# Patient Record
Sex: FEMALE | Race: WHITE
[De-identification: names, ages, dates, MRNs, and addresses within clinical notes are randomized per-mention and may not be internally consistent; named-entity substitution may affect disease eponyms.]

---

## 2017-01-23 ENCOUNTER — HOSPITAL ENCOUNTER (OUTPATIENT)
Age: 72
Discharge: HOME | End: 2017-01-23
Payer: MEDICARE

## 2017-01-23 DIAGNOSIS — E88.81: Primary | ICD-10-CM

## 2017-09-06 ENCOUNTER — HOSPITAL ENCOUNTER (OUTPATIENT)
Dept: HOSPITAL 76 - LAB.R | Age: 72
End: 2017-09-06
Attending: INTERNAL MEDICINE
Payer: MEDICARE

## 2017-09-06 DIAGNOSIS — Z79.899: ICD-10-CM

## 2017-09-06 DIAGNOSIS — E03.9: ICD-10-CM

## 2017-09-06 DIAGNOSIS — I10: ICD-10-CM

## 2017-09-06 DIAGNOSIS — R31.1: ICD-10-CM

## 2017-09-06 DIAGNOSIS — E11.9: ICD-10-CM

## 2017-09-06 DIAGNOSIS — Z98.84: Primary | ICD-10-CM

## 2017-09-06 LAB
ALBUMIN/GLOB SERPL: 1.4 {RATIO} (ref 1–2.2)
ANION GAP SERPL CALCULATED.4IONS-SCNC: 9 MMOL/L (ref 6–13)
BASOPHILS NFR BLD AUTO: 0.1 10^3/UL (ref 0–0.1)
BASOPHILS NFR BLD AUTO: 0.6 %
BILIRUB BLD-MCNC: 0.4 MG/DL (ref 0.2–1)
BUN SERPL-MCNC: 18 MG/DL (ref 6–20)
CALCIUM UR-MCNC: 9.1 MG/DL (ref 8.5–10.3)
CHLORIDE SERPL-SCNC: 104 MMOL/L (ref 101–111)
CHOLEST SERPL-MCNC: 224 MG/DL
CO2 SERPL-SCNC: 25 MMOL/L (ref 21–32)
CREAT SERPLBLD-SCNC: 0.7 MG/DL (ref 0.4–1)
EOSINOPHIL # BLD AUTO: 0.5 10^3/UL (ref 0–0.7)
EOSINOPHIL NFR BLD AUTO: 6.3 %
ERYTHROCYTE [DISTWIDTH] IN BLOOD BY AUTOMATED COUNT: 14.4 % (ref 12–15)
EST. AVERAGE GLUCOSE BLD GHB EST-MCNC: 137 MG/DL (ref 70–100)
GFRSERPLBLD MDRD-ARVRAT: 83 ML/MIN/{1.73_M2} (ref 89–?)
GLOBULIN SER-MCNC: 2.9 G/DL (ref 2.1–4.2)
GLUCOSE SERPL-MCNC: 86 MG/DL (ref 70–100)
HBA1C BLD-MCNC: 0.64 G/DL
HCT VFR BLD AUTO: 42 % (ref 37–47)
HDLC SERPL-MCNC: 81 MG/DL
HDLC SERPL: 2.8 {RATIO} (ref ?–4.4)
HGB UR QL STRIP: 13.4 G/DL (ref 12–16)
LDLC/HDLC SERPL: 1.5 {RATIO} (ref ?–4.4)
LYMPHOCYTES # SPEC AUTO: 3 10^3/UL (ref 1.5–3.5)
LYMPHOCYTES NFR BLD AUTO: 36.8 %
MCH RBC QN AUTO: 28.5 PG (ref 27–31)
MCHC RBC AUTO-ENTMCNC: 31.9 G/DL (ref 32–36)
MCV RBC AUTO: 89.2 FL (ref 81–99)
MONOCYTES # BLD AUTO: 0.6 10^3/UL (ref 0–1)
MONOCYTES NFR BLD AUTO: 7.7 %
NEUTROPHILS # BLD AUTO: 3.9 10^3/UL (ref 1.5–6.6)
NEUTROPHILS # SNV AUTO: 8.1 X10^3/UL (ref 4.8–10.8)
NEUTROPHILS NFR BLD AUTO: 48.6 %
NRBC # BLD AUTO: 0 /100WBC
PDW BLD AUTO: 8.1 FL (ref 7.9–10.8)
POTASSIUM SERPL-SCNC: 3.9 MMOL/L (ref 3.5–5)
PROT SPEC-MCNC: 7 G/DL (ref 6.7–8.2)
RBC MAR: 4.7 10^6/UL (ref 4.2–5.4)
SODIUM SERPLBLD-SCNC: 138 MMOL/L (ref 135–145)
TRIGL P FAST SERPL-MCNC: 119 MG/DL
TSH SERPL-ACNC: 0.73 UIU/ML (ref 0.34–5.6)
VIT B12 SERPL-MCNC: 301 PG/ML (ref 180–914)
VLDLC SERPL-SCNC: 24 MG/DL
WBC # BLD: 8.1 X10^3/UL

## 2017-09-06 PROCEDURE — 85025 COMPLETE CBC W/AUTO DIFF WBC: CPT

## 2017-09-06 PROCEDURE — 84443 ASSAY THYROID STIM HORMONE: CPT

## 2017-09-06 PROCEDURE — 82306 VITAMIN D 25 HYDROXY: CPT

## 2017-09-06 PROCEDURE — 80061 LIPID PANEL: CPT

## 2017-09-06 PROCEDURE — 82607 VITAMIN B-12: CPT

## 2017-09-06 PROCEDURE — 83036 HEMOGLOBIN GLYCOSYLATED A1C: CPT

## 2017-09-06 PROCEDURE — 80053 COMPREHEN METABOLIC PANEL: CPT

## 2017-09-18 ENCOUNTER — HOSPITAL ENCOUNTER (OUTPATIENT)
Dept: HOSPITAL 76 - DI | Age: 72
Discharge: HOME | End: 2017-09-18
Attending: INTERNAL MEDICINE
Payer: MEDICARE

## 2017-09-18 DIAGNOSIS — Z12.31: Primary | ICD-10-CM

## 2017-09-18 PROCEDURE — 77067 SCR MAMMO BI INCL CAD: CPT

## 2017-09-19 NOTE — MAMMOGRAPHY REPORT
DIGITAL BILATERAL SCREENING MAMMOGRAM:  09/18/2017

 

COMPARISON:  07/05/2016, 06/30/2015, 06/12/2015, 02/22/2012, 09/30/2010. 

 

TECHNIQUE:  Bilateral digital CC and MLO projections with an additional 
exaggerated right CC projection.

 

FINDINGS:  There are scattered fibroglandular densities.  There are no dominant 
masses, architectural distortion, skin thickening, or suspicious 
microcalcifications.  Slight asymmetric increased density in the left 
subareolar region is stable.  No significant new findings.  

 

IMPRESSION:  NEGATIVE, BI-RADS 1.  SUGGEST ROUTINE FOLLOWUP IN 12 MONTHS.  

 

STANDARD QUALIFYING STATEMENTS

 

1. This examination was reviewed with the aid of Computer-Aided Detection (CAD).

 

2. A negative or benign imaging report should not delay biopsy if clinically 
suspicious findings are present. Consider surgical consultation if warranted. 
More than 5% of cancers are not identified by imaging.

 

3. Dense breasts may obscure an underlying neoplasm.

 

 

 

DD:09/19/2017 13:10:18  DT: 09/19/2017 14:08  JOB #: [id number]  EXT JOB #:
[id number]

[facility]

## 2017-11-20 ENCOUNTER — HOSPITAL ENCOUNTER (OUTPATIENT)
Dept: HOSPITAL 76 - SC | Age: 72
Discharge: HOME | End: 2017-11-20
Attending: NURSE PRACTITIONER
Payer: MEDICARE

## 2017-11-20 DIAGNOSIS — G47.33: Primary | ICD-10-CM

## 2017-11-20 PROCEDURE — 99214 OFFICE O/P EST MOD 30 MIN: CPT

## 2017-11-20 PROCEDURE — 99212 OFFICE O/P EST SF 10 MIN: CPT

## 2017-12-19 ENCOUNTER — HOSPITAL ENCOUNTER (OUTPATIENT)
Dept: HOSPITAL 76 - DI | Age: 72
Discharge: HOME | End: 2017-12-19
Attending: INTERNAL MEDICINE
Payer: MEDICARE

## 2017-12-19 DIAGNOSIS — R05: Primary | ICD-10-CM

## 2017-12-19 PROCEDURE — 71020: CPT

## 2017-12-19 NOTE — XRAY REPORT
EXAM:

CHEST RADIOGRAPHY

 

EXAM DATE: 12/19/2017 12:28 PM.

 

CLINICAL HISTORY: COUGH.

 

COMPARISON: 09/16/2014.

 

TECHNIQUE: 2 views.

 

FINDINGS: 

Lungs/Pleura: Increased very mild left basilar opacity which could represent atelectasis or infiltrat
e. No dense consolidation. No gross pleural effusion. No pneumothorax. Pattern suspicious for ascendi
ng aortic ectasia, possibly increased.

 

Mediastinum: Heart and mediastinal contours are unremarkable.

 

Other: None.

 

IMPRESSION: 

1. Increased very mild left basilar atelectasis or infiltrate. No focal consolidation. 

2. Probable ascending aortic ectasia, apparently increased compared with 2014.

 

[facility]

Referring Provider Line: [phone]

 

SITE ID: [id number]

## 2017-12-19 NOTE — XRAY PRELIMINARY REPORT
Accession: [id number]

Exam: XR CHEST 2 VIEW PA/LAT

 

IMPRESSION: 

1. Increased very mild left basilar atelectasis or infiltrate. No focal consolidation. 

2. Probable ascending aortic ectasia, apparently increased compared with 2014.

 

[facility]

 

SITE ID: [id number]

## 2018-03-13 ENCOUNTER — HOSPITAL ENCOUNTER (OUTPATIENT)
Dept: HOSPITAL 76 - LAB.R | Age: 73
Discharge: HOME | End: 2018-03-13
Attending: INTERNAL MEDICINE
Payer: MEDICARE

## 2018-03-13 DIAGNOSIS — E11.9: Primary | ICD-10-CM

## 2018-03-13 LAB
EST. AVERAGE GLUCOSE BLD GHB EST-MCNC: 146 MG/DL (ref 70–100)
HB2 TOTAL: 14.8 G/DL
HBA1C BLD-MCNC: 0.73 G/DL
HEMOGLOBIN A1C %: 6.7 % (ref 4.6–6.2)

## 2018-03-13 PROCEDURE — 83036 HEMOGLOBIN GLYCOSYLATED A1C: CPT

## 2018-07-31 ENCOUNTER — HOSPITAL ENCOUNTER (OUTPATIENT)
Dept: HOSPITAL 76 - LAB.R | Age: 73
Discharge: HOME | End: 2018-07-31
Attending: INTERNAL MEDICINE
Payer: MEDICARE

## 2018-07-31 DIAGNOSIS — I10: ICD-10-CM

## 2018-07-31 DIAGNOSIS — E03.9: Primary | ICD-10-CM

## 2018-07-31 DIAGNOSIS — E11.9: ICD-10-CM

## 2018-07-31 LAB
ALBUMIN DIAFP-MCNC: 3.9 G/DL (ref 3.2–5.5)
ALBUMIN/GLOB SERPL: 1.2 {RATIO} (ref 1–2.2)
ALP SERPL-CCNC: 91 IU/L (ref 42–121)
ALT SERPL W P-5'-P-CCNC: 27 IU/L (ref 10–60)
ANION GAP SERPL CALCULATED.4IONS-SCNC: 7 MMOL/L (ref 6–13)
AST SERPL W P-5'-P-CCNC: 26 IU/L (ref 10–42)
BILIRUB BLD-MCNC: 0.9 MG/DL (ref 0.2–1)
BUN SERPL-MCNC: 16 MG/DL (ref 6–20)
CALCIUM UR-MCNC: 9.1 MG/DL (ref 8.5–10.3)
CHLORIDE SERPL-SCNC: 102 MMOL/L (ref 101–111)
CO2 SERPL-SCNC: 28 MMOL/L (ref 21–32)
CREAT SERPLBLD-SCNC: 0.7 MG/DL (ref 0.4–1)
EST. AVERAGE GLUCOSE BLD GHB EST-MCNC: 137 MG/DL (ref 70–100)
GFRSERPLBLD MDRD-ARVRAT: 82 ML/MIN/{1.73_M2} (ref 89–?)
GLOBULIN SER-MCNC: 3.2 G/DL (ref 2.1–4.2)
GLUCOSE SERPL-MCNC: 98 MG/DL (ref 70–100)
HB2 TOTAL: 14.1 G/DL
HBA1C BLD-MCNC: 0.65 G/DL
HEMOGLOBIN A1C %: 6.4 % (ref 4.6–6.2)
PROT SPEC-MCNC: 7.1 G/DL (ref 6.7–8.2)
SODIUM SERPLBLD-SCNC: 137 MMOL/L (ref 135–145)

## 2018-07-31 PROCEDURE — 83036 HEMOGLOBIN GLYCOSYLATED A1C: CPT

## 2018-07-31 PROCEDURE — 80053 COMPREHEN METABOLIC PANEL: CPT

## 2018-07-31 PROCEDURE — 84443 ASSAY THYROID STIM HORMONE: CPT

## 2018-08-30 ENCOUNTER — HOSPITAL ENCOUNTER (OUTPATIENT)
Dept: HOSPITAL 76 - LAB.R | Age: 73
Discharge: HOME | End: 2018-08-30
Attending: PHYSICIAN ASSISTANT
Payer: MEDICARE

## 2018-08-30 DIAGNOSIS — N30.00: Primary | ICD-10-CM

## 2018-08-30 PROCEDURE — 87086 URINE CULTURE/COLONY COUNT: CPT

## 2018-12-03 ENCOUNTER — HOSPITAL ENCOUNTER (OUTPATIENT)
Dept: HOSPITAL 76 - SC | Age: 73
Discharge: HOME | End: 2018-12-03
Attending: NURSE PRACTITIONER
Payer: MEDICARE

## 2018-12-03 DIAGNOSIS — G47.33: Primary | ICD-10-CM

## 2018-12-03 PROCEDURE — 99212 OFFICE O/P EST SF 10 MIN: CPT

## 2018-12-03 PROCEDURE — 99214 OFFICE O/P EST MOD 30 MIN: CPT

## 2018-12-04 ENCOUNTER — HOSPITAL ENCOUNTER (OUTPATIENT)
Dept: HOSPITAL 76 - LAB.R | Age: 73
Discharge: HOME | End: 2018-12-04
Attending: INTERNAL MEDICINE
Payer: MEDICARE

## 2018-12-04 DIAGNOSIS — Z79.899: ICD-10-CM

## 2018-12-04 DIAGNOSIS — E03.9: ICD-10-CM

## 2018-12-04 DIAGNOSIS — Z98.84: Primary | ICD-10-CM

## 2018-12-04 DIAGNOSIS — I10: ICD-10-CM

## 2018-12-04 DIAGNOSIS — E11.9: ICD-10-CM

## 2018-12-04 LAB
ALBUMIN DIAFP-MCNC: 4.1 G/DL (ref 3.2–5.5)
ALBUMIN/GLOB SERPL: 1.3 {RATIO} (ref 1–2.2)
ALP SERPL-CCNC: 90 IU/L (ref 42–121)
ALT SERPL W P-5'-P-CCNC: 29 IU/L (ref 10–60)
ANION GAP SERPL CALCULATED.4IONS-SCNC: 6 MMOL/L (ref 6–13)
AST SERPL W P-5'-P-CCNC: 26 IU/L (ref 10–42)
BASOPHILS NFR BLD AUTO: 0.1 10^3/UL (ref 0–0.1)
BASOPHILS NFR BLD AUTO: 0.8 %
BILIRUB BLD-MCNC: 0.6 MG/DL (ref 0.2–1)
BUN SERPL-MCNC: 17 MG/DL (ref 6–20)
CALCIUM UR-MCNC: 9.2 MG/DL (ref 8.5–10.3)
CHLORIDE SERPL-SCNC: 102 MMOL/L (ref 101–111)
CO2 SERPL-SCNC: 30 MMOL/L (ref 21–32)
CREAT SERPLBLD-SCNC: 0.7 MG/DL (ref 0.4–1)
EOSINOPHIL # BLD AUTO: 0.4 10^3/UL (ref 0–0.7)
EOSINOPHIL NFR BLD AUTO: 5 %
ERYTHROCYTE [DISTWIDTH] IN BLOOD BY AUTOMATED COUNT: 13.8 % (ref 12–15)
EST. AVERAGE GLUCOSE BLD GHB EST-MCNC: 134 MG/DL (ref 70–100)
GFRSERPLBLD MDRD-ARVRAT: 82 ML/MIN/{1.73_M2} (ref 89–?)
GLOBULIN SER-MCNC: 3.2 G/DL (ref 2.1–4.2)
GLUCOSE SERPL-MCNC: 104 MG/DL (ref 70–100)
HB2 TOTAL: 14.4 G/DL
HBA1C BLD-MCNC: 0.65 G/DL
HEMOGLOBIN A1C %: 6.3 % (ref 4.6–6.2)
HGB UR QL STRIP: 13.6 G/DL (ref 12–16)
LYMPHOCYTES # SPEC AUTO: 2.8 10^3/UL (ref 1.5–3.5)
LYMPHOCYTES NFR BLD AUTO: 32.9 %
MCH RBC QN AUTO: 29.8 PG (ref 27–31)
MCHC RBC AUTO-ENTMCNC: 33 G/DL (ref 32–36)
MCV RBC AUTO: 90.3 FL (ref 81–99)
MONOCYTES # BLD AUTO: 0.6 10^3/UL (ref 0–1)
MONOCYTES NFR BLD AUTO: 6.9 %
NEUTROPHILS # BLD AUTO: 4.7 10^3/UL (ref 1.5–6.6)
NEUTROPHILS # SNV AUTO: 8.6 X10^3/UL (ref 4.8–10.8)
NEUTROPHILS NFR BLD AUTO: 54.4 %
PDW BLD AUTO: 7.9 FL (ref 7.9–10.8)
PLATELET # BLD: 204 10^3/UL (ref 130–450)
PROT SPEC-MCNC: 7.3 G/DL (ref 6.7–8.2)
RBC MAR: 4.56 10^6/UL (ref 4.2–5.4)
SODIUM SERPLBLD-SCNC: 138 MMOL/L (ref 135–145)

## 2018-12-04 PROCEDURE — 85025 COMPLETE CBC W/AUTO DIFF WBC: CPT

## 2018-12-04 PROCEDURE — 82306 VITAMIN D 25 HYDROXY: CPT

## 2018-12-04 PROCEDURE — 84443 ASSAY THYROID STIM HORMONE: CPT

## 2018-12-04 PROCEDURE — 80053 COMPREHEN METABOLIC PANEL: CPT

## 2018-12-04 PROCEDURE — 83036 HEMOGLOBIN GLYCOSYLATED A1C: CPT

## 2019-11-21 ENCOUNTER — HOSPITAL ENCOUNTER (OUTPATIENT)
Dept: HOSPITAL 76 - SC | Age: 74
Discharge: HOME | End: 2019-11-21
Attending: NURSE PRACTITIONER
Payer: MEDICARE

## 2019-11-21 VITALS — SYSTOLIC BLOOD PRESSURE: 138 MMHG | DIASTOLIC BLOOD PRESSURE: 80 MMHG

## 2019-11-21 DIAGNOSIS — G47.33: Primary | ICD-10-CM

## 2019-11-21 PROCEDURE — 99212 OFFICE O/P EST SF 10 MIN: CPT

## 2019-11-21 PROCEDURE — 99214 OFFICE O/P EST MOD 30 MIN: CPT

## 2019-11-21 NOTE — SLEEP CARE CONSULTATION
Information from patient questionnaire entered by Renetta Euceda.





I have reviewed and concur with the information entered by Renetta Euceda. 

This document represents the service I personally performed and the decisions 

made by me, Radha Burrlel, RN, MSN, ARNP.





History of Present Illness


Previous diagnosis: Moderate, Obstructive Sleep Apnea-Hypopnea Syndrome


AHI: 20.9


Reason for follow up: annual (last seen 2018)


Equipment type: CPAP


Equipment obtained from: [employer]


Mask style: Nasal (Dreamwear)


Mask brand: Respironics


Backup mask available: Yes


Last cushion change: 2 weeks ago 





CPAP Compliance Data





- Data Reviewed with Patient


Average duration of nightly device use: 7.6


Compliance rate %: 98.9 (180 days)


Current pressure setting (cmH2O): 8


Humidity settin


Heated hose settin


Average residual AHI: 2.1


Average large leak: 1 min 20 sec





Subjective


Missed days of use due to: reports: other (power outage)


Patient concerns: reports: air blowing in eyes (rare), nasal congestion 

(seasonal - as currently ), dry mouth, nose, throat ([place]  denies: aerophagia, mask discomfort, mask leak noise, condensation in

mask/hose, epistaxis, other


Observed to snore while using device: No


Current pressure setting perceived as: comfortable


On therapy, patient: reports: sleeping better, awakening more refreshed, being 

more awake and alert during the day, more rested overall.  denies: drowsiness 

while driving


Initial Jbphh Sleepiness Scale score: 1


Current Jbphh Sleepiness Scale score: 3





Allergies and Home Medications


Known drug allergies: Yes (aspirin, NSAIDs., shrimp, guacolmole )


Drug allergies reviewed: Yes


Home medication list reviewed: Yes


Allergy and home medication list: 











Losartan Potassium 50mg tab one daily


 


Ferrous Sulfate 325mg tab one weekly


 


Levothyroxine Sodium 100mcg tab one daily


 


Efudex 5% external cream Per derm as needed


 


Zinc 50mg tab one daily


 


Vitamin C 1000mg tab one daily


 


Oyster Shell Calcium/D (citracal) Tab one daily


 


Multi-Vitamin Tab one daily


 


Magnesium 400mg tab one every other day


 


Move Free Joint Health Advance Oral Tab two daily


 


CoQ-10 300mg cap one every other day 


 


Timilol 5% Ophthalmic solution One drop each eye twice daily


 


Latanoprost .005% Ophthalmic solution One drop each eye daily


 


Vitamin D 400U cap one daily











Review of Systems


Review of systems same as previous: Yes (shrimp allergy noted on birthday)





Physical Exam


Blood Pressure: 138/80


Cuff size: long


Heart Rate: 74


O2 Saturation: 97


Height: 5 ft 9.5 in


Weight: 203 lb (with boots )


Weight change since last visit: lost 3.8 pounds 


Body Mass Index: 29.5


BMI Classification: Overweight





Impression and Plan





1. Obstructive Sleep Apnea-Hypopnea Syndrome, moderate, with good treatment 

compliance and good apnea control. On CPAP therapy, the patient has better sleep

quality and is more rested overall. We discussed how to adjust heated hose and 

humidity in different living environments as she travels as a ranger during the 

summer. Evidently she was adjusting settings opposite of what she needed and 

[place].  We also discussed

how to adjust the humidity to reduce nasal congestion.  Printed Web instructions

given with additional written explanations as instructed on sample device. For 

her filters not lasting when in [place] environment, she is advised that if needs 

more than the insurance will cover, she can buy at local stores or online. There

are times when no electricity is available in her job. When unable to use CPAP 

she is to avoid supine sleep with pillow positioning as her apnea is more severe

supine.  Patient's apnea severity and rationale for treatment to reduce apnea, 

improve sleep quality and reduce cardiovascular and cerebrovascular events was 

reviewed. I also reviewed the benefit of consistent device use of CPAP for 

hypertension, diabetes.





* Continue CPAP pressure at 8  cmH2O


* change humidity and heated hose as discussed. 


* Notify me if snoring with mask or feeling that the pressure is too much or too

  little


* Attempt to lose weight


* Return for follow up in 1 year, or sooner if concerns arise








I spent 100% of this 30 minute visit face to face with the patient with greater 

than 50% of this was spent time counseling the patient and coordination of care.

Extra time also taken due to patient forgetting her hearing aids and need to 

repeat conversation.

## 2020-11-05 ENCOUNTER — HOSPITAL ENCOUNTER (OUTPATIENT)
Dept: HOSPITAL 76 - SC | Age: 75
Discharge: HOME | End: 2020-11-05
Attending: NURSE PRACTITIONER
Payer: MEDICARE

## 2020-11-05 DIAGNOSIS — G47.33: Primary | ICD-10-CM

## 2020-11-05 NOTE — SLEEP CARE CONSULTATION
Information from patient questionnaire entered by Renetta Euceda.





I have reviewed and concur with the information entered by Renetta Euceda. 

This document represents the service I personally performed and the decisions 

made by me, Esperanza Mejia ARNP.





History of Present Illness


Service Date and Time: 2020    0840


Previous diagnosis: Moderate, Obstructive Sleep Apnea-Hypopnea Syndrome


AHI: 20.9 (in 2016)(20.3 in 2014)


Reason for follow up: annual (last seen )


Equipment type: CPAP


Equipment obtained from: [employer] (getting supplies as needed)


Mask style: Nasal


Mask brand: Respironics (Dreamwear)


Backup mask available: Yes (old mask)


Last cushion change: 2 weeks


Prior sleep studies: Yes


Year and Where:  and  - [facility]


Type of Sleep Study: Polysomnography


HPI additional information: 





SHANE NAYAK was diagnosed to have moderate, AHI 20.9, obstructive sleep 

apnea-hypopnea syndrome and returns via Telehealth video today for CPAP therapy 

annual follow-up.





CPAP Compliance Data





- Data Reviewed with Patient


Average duration of nightly device use: 7.7


Compliance rate %: 98.9 (180 days)


Current pressure setting (cmH2O): 8


Humidity settin


Heated hose settin


Average residual AHI: 2.8


Central apnea: 0.1


Obstructive apnea: 2.1


Average large leak: 3 min 31 sec





Subjective


Patient concerns: reports: dry mouth, nose, throat (dry nose when reservoir 

before end of night, [place]), other (headgear 

catching hair and sliding on hair).  denies: aerophagia, mask discomfort, air 

blowing in eyes, mask leak noise, condensation in mask/hose, nasal congestion, 

epistaxis


Observed to snore while using device: No


Current pressure setting perceived as: comfortable


On therapy, patient: reports: sleeping better, awakening more refreshed, being 

more awake and alert during the day, more rested overall.  denies: drowsiness 

while driving


Initial [place] Sleepiness Scale score: 5 (in )


Current [place] Sleepiness Scale score: 6





Allergies and Home Medications


Drug allergies reviewed: Yes (see list above)


Home medication list reviewed: Yes (no changes)





Review of Systems


Review of systems same as previous: Yes (no changes)





Physical Exam


Vital signs obtained and entered by: Telehealth visit to reduce exposure during 

Covid pandemic


Height: 5 ft 9.5 in





Impression and Plan





1. Obstructive Sleep Apnea-Hypopnea Syndrome, moderate, with good treatment 

compliance and good apnea control. On CPAP therapy, the patient has better sleep

quality and is more rested overall. She has had some nasal dryness. [place]. Nasal dryness can be reduced with 

increasing the CPAP humidity and the heated hose can be increased if 

condensation. She was encouraged to try reducing the heated hose first and then 

increasing humidity as needed. She voiced understanding. Patient's apnea 

severity and rationale for treatment to reduce apnea, improve sleep quality and 

reduce cardiovascular and cerebrovascular events was reviewed. I also reviewed 

the benefit of consistent device use of CPAP for hypertension and diabetes.





* Continue autoCPAP pressure at 8 cmH2O


* Notify me if snoring with mask or feeling that the pressure is too much or too

  little


* Attempt to lose weight


* Call this office if any problems using CPAP


* Return for follow up in 1 year, or sooner if concerns arise





Counseling Topics: Spare mask


Visit Type: Telehealth Video


Video Type: [place]


Patient Location: Car


Location of Provider: Office


Time Spent with Patient (minutes): 17


Provider Statement: I spent 100% of the Telehealth Video Call with the patient 

with greater than 50% spent counseling the patient and coordination of care.

## 2020-11-24 ENCOUNTER — HOSPITAL ENCOUNTER (OUTPATIENT)
Dept: HOSPITAL 76 - LAB.WCP | Age: 75
Discharge: HOME | End: 2020-11-24
Attending: FAMILY MEDICINE
Payer: MEDICARE

## 2020-11-24 DIAGNOSIS — E11.9: ICD-10-CM

## 2020-11-24 DIAGNOSIS — E66.9: ICD-10-CM

## 2020-11-24 DIAGNOSIS — M19.90: ICD-10-CM

## 2020-11-24 DIAGNOSIS — I10: ICD-10-CM

## 2020-11-24 DIAGNOSIS — E03.9: Primary | ICD-10-CM

## 2020-11-24 LAB
ALBUMIN DIAFP-MCNC: 3.7 G/DL (ref 3.2–5.5)
ALBUMIN/GLOB SERPL: 1.3 {RATIO} (ref 1–2.2)
ALP SERPL-CCNC: 80 IU/L (ref 42–121)
ALT SERPL W P-5'-P-CCNC: 24 IU/L (ref 10–60)
ANION GAP SERPL CALCULATED.4IONS-SCNC: 4 MMOL/L (ref 6–13)
AST SERPL W P-5'-P-CCNC: 24 IU/L (ref 10–42)
BASOPHILS NFR BLD AUTO: 0.1 10^3/UL (ref 0–0.1)
BASOPHILS NFR BLD AUTO: 0.9 %
BILIRUB BLD-MCNC: 0.7 MG/DL (ref 0.2–1)
BUN SERPL-MCNC: 20 MG/DL (ref 6–20)
CALCIUM UR-MCNC: 8.8 MG/DL (ref 8.5–10.3)
CHLORIDE SERPL-SCNC: 108 MMOL/L (ref 101–111)
CHOLEST SERPL-MCNC: 183 MG/DL
CO2 SERPL-SCNC: 28 MMOL/L (ref 21–32)
CREAT SERPLBLD-SCNC: 0.8 MG/DL (ref 0.4–1)
EOSINOPHIL # BLD AUTO: 0.4 10^3/UL (ref 0–0.7)
EOSINOPHIL NFR BLD AUTO: 5.2 %
ERYTHROCYTE [DISTWIDTH] IN BLOOD BY AUTOMATED COUNT: 13.3 % (ref 12–15)
GLOBULIN SER-MCNC: 2.9 G/DL (ref 2.1–4.2)
GLUCOSE SERPL-MCNC: 109 MG/DL (ref 70–100)
HBA1C MFR BLD HPLC: 6.4 % (ref 4.27–6.07)
HDLC SERPL-MCNC: 71 MG/DL
HDLC SERPL: 2.6 {RATIO} (ref ?–4.4)
HGB UR QL STRIP: 13 G/DL (ref 12–16)
LDLC SERPL CALC-MCNC: 99 MG/DL
LDLC/HDLC SERPL: 1.4 {RATIO} (ref ?–4.4)
LYMPHOCYTES # SPEC AUTO: 2.4 10^3/UL (ref 1.5–3.5)
LYMPHOCYTES NFR BLD AUTO: 30.7 %
MAGNESIUM SERPL-MCNC: 2 MG/DL (ref 1.7–2.8)
MCH RBC QN AUTO: 29.4 PG (ref 27–31)
MCHC RBC AUTO-ENTMCNC: 31 G/DL (ref 32–36)
MCV RBC AUTO: 95 FL (ref 81–99)
MONOCYTES # BLD AUTO: 0.7 10^3/UL (ref 0–1)
MONOCYTES NFR BLD AUTO: 8.3 %
NEUTROPHILS # BLD AUTO: 4.3 10^3/UL (ref 1.5–6.6)
NEUTROPHILS # SNV AUTO: 7.9 X10^3/UL (ref 4.8–10.8)
NEUTROPHILS NFR BLD AUTO: 54.6 %
PDW BLD AUTO: 10.1 FL (ref 7.9–10.8)
PLATELET # BLD: 166 10^3/UL (ref 130–450)
PROT SPEC-MCNC: 6.6 G/DL (ref 6.7–8.2)
RBC MAR: 4.42 10^6/UL (ref 4.2–5.4)
SODIUM SERPLBLD-SCNC: 140 MMOL/L (ref 135–145)
T3FREE SERPL-MCNC: 2.87 PG/ML (ref 2.5–3.9)
T4 FREE SERPL-MCNC: 1.06 NG/DL (ref 0.58–1.64)
TSH SERPL-ACNC: 0.82 UIU/ML (ref 0.34–5.6)
VLDLC SERPL-SCNC: 13 MG/DL

## 2020-11-24 PROCEDURE — 83036 HEMOGLOBIN GLYCOSYLATED A1C: CPT

## 2020-11-24 PROCEDURE — 84481 FREE ASSAY (FT-3): CPT

## 2020-11-24 PROCEDURE — 85025 COMPLETE CBC W/AUTO DIFF WBC: CPT

## 2020-11-24 PROCEDURE — 84443 ASSAY THYROID STIM HORMONE: CPT

## 2020-11-24 PROCEDURE — 83735 ASSAY OF MAGNESIUM: CPT

## 2020-11-24 PROCEDURE — 80061 LIPID PANEL: CPT

## 2020-11-24 PROCEDURE — 80053 COMPREHEN METABOLIC PANEL: CPT

## 2020-11-24 PROCEDURE — 84439 ASSAY OF FREE THYROXINE: CPT

## 2020-11-24 PROCEDURE — 36415 COLL VENOUS BLD VENIPUNCTURE: CPT

## 2020-11-24 PROCEDURE — 83721 ASSAY OF BLOOD LIPOPROTEIN: CPT

## 2021-08-13 ENCOUNTER — HOSPITAL ENCOUNTER (OUTPATIENT)
Dept: HOSPITAL 76 - LAB.WCP | Age: 76
Discharge: HOME | End: 2021-08-13
Attending: FAMILY MEDICINE
Payer: MEDICARE

## 2021-08-13 DIAGNOSIS — I10: ICD-10-CM

## 2021-08-13 DIAGNOSIS — E11.9: Primary | ICD-10-CM

## 2021-08-13 LAB
ANION GAP SERPL CALCULATED.4IONS-SCNC: 8 MMOL/L (ref 6–13)
BUN SERPL-MCNC: 15 MG/DL (ref 6–20)
CALCIUM UR-MCNC: 8.8 MG/DL (ref 8.5–10.3)
CHLORIDE SERPL-SCNC: 105 MMOL/L (ref 101–111)
CO2 SERPL-SCNC: 26 MMOL/L (ref 21–32)
CREAT SERPLBLD-SCNC: 0.8 MG/DL (ref 0.4–1)
CREAT UR-SCNC: 75.6 MG/DL
EST. AVERAGE GLUCOSE BLD GHB EST-MCNC: 143 MG/DL (ref 70–100)
GFRSERPLBLD MDRD-ARVRAT: 70 ML/MIN/{1.73_M2} (ref 89–?)
GLUCOSE SERPL-MCNC: 152 MG/DL (ref 70–100)
HBA1C MFR BLD HPLC: 6.6 % (ref 4.27–6.07)
MICROALBUMIN UR-MCNC: < 0.2 MG/DL (ref 0–300)
POTASSIUM SERPL-SCNC: 4.4 MMOL/L (ref 3.5–5)
SODIUM SERPLBLD-SCNC: 139 MMOL/L (ref 135–145)

## 2021-08-13 PROCEDURE — 80048 BASIC METABOLIC PNL TOTAL CA: CPT

## 2021-08-13 PROCEDURE — 83036 HEMOGLOBIN GLYCOSYLATED A1C: CPT

## 2021-08-13 PROCEDURE — 82570 ASSAY OF URINE CREATININE: CPT

## 2021-08-13 PROCEDURE — 82043 UR ALBUMIN QUANTITATIVE: CPT

## 2021-08-13 PROCEDURE — 36415 COLL VENOUS BLD VENIPUNCTURE: CPT

## 2021-08-27 ENCOUNTER — HOSPITAL ENCOUNTER (OUTPATIENT)
Dept: HOSPITAL 76 - SC | Age: 76
Discharge: HOME | End: 2021-08-27
Attending: NURSE PRACTITIONER
Payer: MEDICARE

## 2021-08-27 DIAGNOSIS — E66.9: ICD-10-CM

## 2021-08-27 DIAGNOSIS — G47.33: Primary | ICD-10-CM

## 2021-08-27 PROCEDURE — 99213 OFFICE O/P EST LOW 20 MIN: CPT

## 2021-08-27 PROCEDURE — 99212 OFFICE O/P EST SF 10 MIN: CPT

## 2021-08-27 NOTE — SLEEP CARE CONSULTATION
Information from patient questionnaire entered by Renetta Euceda.





I have reviewed and concur with the information entered by Renetta Euceda. 

This document represents the service I personally performed and the decisions 

made by me, Esperanza Mejia ARNP.





History of Present Illness


Service Date and Time: 2021    1448


Previous diagnosis: Moderate, Obstructive Sleep Apnea-Hypopnea Syndrome


AHI: 20.9 (in )(20.3 in )


Reason for follow up: other (9 month,  CPAP recall)


Equipment type: CPAP


Equipment obtained from: [employer] (getting supplies as needed)


Mask style: Nasal


Backup mask available: Yes (old mask)


Prior sleep studies: Yes


Year and Where:  and  - [facility]


Type of Sleep Study: Polysomnography


HPI additional information: 





YOJANA NAYAK was diagnosed to have moderate, AHI 20.9, obstructive sleep a

pnea-hypopnea syndrome and returned today for CPAP therapy 9 month follow-up on 

CPAP recall.





CPAP Compliance Data





- Data Reviewed with Patient


Average duration of nightly device use: 7 hr 42 min


Compliance rate %: 99.4 (180 days)


Current pressure setting (cmH2O): 8


Humidity settin


Heated hose settin


Average residual AHI: 2.5


Average large leak: 17 min 57 sec





Subjective


Missed days of use due to: reports: other (no electricity)


Patient concerns: reports: other (last 6 months having coughing fits when using 

the device at night).  denies: aerophagia, mask discomfort, air blowing in eyes,

mask leak noise, condensation in mask/hose, nasal congestion, dry mouth, nose, 

throat, epistaxis


Observed to snore while using device: No


Current pressure setting perceived as: comfortable


On therapy, patient: reports: sleeping better, awakening more refreshed, being 

more awake and alert during the day, more rested overall.  denies: drowsiness 

while driving


Initial [place] Sleepiness Scale score: 5 (in )


Current [place]raw[place] Sleepiness Scale score: 7





Allergies and Home Medications


Home medication list reviewed: Yes (statin)





Review of Systems


Review of systems same as previous: Yes (no changes)





Physical Exam


Heart Rate: 77


O2 Saturation: 96


Height: 5 ft 9.5 in


Weight: 213 lb


Body Mass Index: 30.9


BMI Classification: Obese





Impression and Plan





1. Obstructive Sleep Apnea-Hypopnea Syndrome, moderate, with excellent treatment

compliance and good apnea control. On CPAP therapy, the patient has better sleep

quality and is more rested overall. Patient has come in to discuss the [employer] recall on their device. Yojana states that in the last 6 months she 

has been having difficulty with coughing fits when using her CPAP machine at 

night.  When she found out about the recall she stopped using it 2 days ago and 

has not been having these nightly coughing fits that she was having with the 

CPAP.  She has coughed so hard that she almost threw up a few times.  I advised 

her not to continue using this device and that we should replace it with a new 

device that is not on the recall. Patient has already registered their device 

for the recall. Patient denies any black particles seen in machine or hoses, any

unusual odors coming from device. Patient voiced understanding and agreement 

with plan.


Patient's apnea severity and rationale for treatment to reduce apnea, improve 

sleep quality and reduce cardiovascular and cerebrovascular events was reviewed.

I also reviewed the benefit of consistent device use of CPAP for hypertension 

and diabetes. Patient states she maintained her weight at a comfortable weight 

for herself because she is very active with her job.





* Continue auto  CPAP pressure at 8 cmH2O


* Update machine


* Notify me if snoring with mask or feeling that the pressure is too much or too

  little


* Maintain a healthy weight


* Call this office if any problems using CPAP


* Return for follow up one month after obtaining new device, or sooner if 

  concerns arise





Counseling Topics: Spare mask, Weight loss health impact


Visit Type: In Office


Time Spent with Patient (minutes): 21


Provider Statement: I spent 100% of the Face to Face Visit with the patient with

greater than 50% spent counseling the patient and coordination of care.